# Patient Record
Sex: FEMALE | Race: AMERICAN INDIAN OR ALASKA NATIVE
[De-identification: names, ages, dates, MRNs, and addresses within clinical notes are randomized per-mention and may not be internally consistent; named-entity substitution may affect disease eponyms.]

---

## 2020-01-07 ENCOUNTER — HOSPITAL ENCOUNTER (OUTPATIENT)
Dept: HOSPITAL 5 - TRG | Age: 30
LOS: 3 days | Discharge: HOME | End: 2020-01-10
Attending: OBSTETRICS & GYNECOLOGY
Payer: MEDICAID

## 2020-01-07 DIAGNOSIS — O36.5930: Primary | ICD-10-CM

## 2020-01-07 DIAGNOSIS — Z3A.31: ICD-10-CM

## 2020-01-07 PROCEDURE — 76816 OB US FOLLOW-UP PER FETUS: CPT

## 2020-01-07 PROCEDURE — 76820 UMBILICAL ARTERY ECHO: CPT

## 2020-01-07 PROCEDURE — 86850 RBC ANTIBODY SCREEN: CPT

## 2020-01-07 PROCEDURE — 83735 ASSAY OF MAGNESIUM: CPT

## 2020-01-07 PROCEDURE — 86900 BLOOD TYPING SEROLOGIC ABO: CPT

## 2020-01-07 PROCEDURE — 96360 HYDRATION IV INFUSION INIT: CPT

## 2020-01-07 PROCEDURE — 86901 BLOOD TYPING SEROLOGIC RH(D): CPT

## 2020-01-07 PROCEDURE — 96366 THER/PROPH/DIAG IV INF ADDON: CPT

## 2020-01-07 PROCEDURE — 36415 COLL VENOUS BLD VENIPUNCTURE: CPT

## 2020-01-07 PROCEDURE — 85027 COMPLETE CBC AUTOMATED: CPT

## 2020-01-07 PROCEDURE — 96372 THER/PROPH/DIAG INJ SC/IM: CPT

## 2020-01-07 PROCEDURE — 96361 HYDRATE IV INFUSION ADD-ON: CPT

## 2020-01-07 PROCEDURE — 96365 THER/PROPH/DIAG IV INF INIT: CPT

## 2020-01-07 PROCEDURE — 76819 FETAL BIOPHYS PROFIL W/O NST: CPT

## 2020-01-07 RX ADMIN — BETAMETHASONE SODIUM PHOSPHATE AND BETAMETHASONE ACETATE SCH MG: 3; 3 INJECTION, SUSPENSION INTRA-ARTICULAR; INTRALESIONAL; INTRAMUSCULAR at 23:42

## 2020-01-07 RX ADMIN — SODIUM CHLORIDE, SODIUM LACTATE, POTASSIUM CHLORIDE, AND CALCIUM CHLORIDE SCH MLS/HR: .6; .31; .03; .02 INJECTION, SOLUTION INTRAVENOUS at 23:13

## 2020-01-08 LAB
HCT VFR BLD CALC: 39.8 % (ref 30.3–42.9)
HGB BLD-MCNC: 13.6 GM/DL (ref 10.1–14.3)
MCHC RBC AUTO-ENTMCNC: 34 % (ref 30–34)
MCV RBC AUTO: 95 FL (ref 79–97)
PLATELET # BLD: 315 K/MM3 (ref 140–440)
RBC # BLD AUTO: 4.2 M/MM3 (ref 3.65–5.03)

## 2020-01-08 RX ADMIN — SODIUM CHLORIDE, SODIUM LACTATE, POTASSIUM CHLORIDE, AND CALCIUM CHLORIDE SCH MLS/HR: .6; .31; .03; .02 INJECTION, SOLUTION INTRAVENOUS at 11:59

## 2020-01-08 RX ADMIN — ZOLPIDEM TARTRATE PRN MG: 5 TABLET ORAL at 01:05

## 2020-01-08 NOTE — ULTRASOUND REPORT
Umbilical arterial Ultrasound

Biophysical profile



HISTORY: Severe IUGR hx absent end diastolic flow.



TECHNIQUE:  Grayscale and color Doppler  imaging performed.



COMPARISON:  None



FINDINGS: On biophysical profile, the fetus received a score of 2/2 for breathing, movement, tone, an
d ANGELIA.  Total score was 8/8.  



3 segments of the umbilical cord were evaluated. The average systolic to diastolic ratio within these
 loops was 3.7 which is within normal limits. Normal waveform identified. Persistent flow. The resist
divya index average was 0.7 which is within normal limits with normal waveform and persistent blood jojo
w.



IMPRESSION: 

1. Normal BPP.

2. Normal umbilical arterial evaluation.



Signer Name: Wolfgang Lopez MD 

Signed: 1/8/2020 6:12 PM

 Workstation Name: [id number]

## 2020-01-08 NOTE — HISTORY AND PHYSICAL REPORT
History of Present Illness


Date of examination: 20


Date of admission: 


20


Chief complaint: 


Sent from [facility] for severe IUGR with absent end diastolic flow.


History of present illness: 


Pt is a 30 yo  at 31.0 weeks EGA who presents with severe IUGR (EFW 1%) 

and absent end diastolic flow per [facility] ultrasound yesterday. She has received 

minimal prenatal care with [facility], most recent visit was 

19. She has a history of  x1 for severe IUGR at 29 weeks.





Past History


Past Medical History: no pertinent history


Past Surgical History:  section


Social history: no significant social history





- Obstetrical History


Expected Date of Delivery: 20


Actual Gestation: 31 Week(s) 0 Day(s) 


: 2


Para: 0


Hx # Term Pregnancies: 0


Number of  Pregnancies: 1


Number of Living Children: 1





Medications and Allergies


                                    Allergies











Allergy/AdvReac Type Severity Reaction Status Date / Time


 


No Known Allergies Allergy   Verified 20 21:30











                                Home Medications











 Medication  Instructions  Recorded  Confirmed  Last Taken  Type


 


No Known Home Medications [No  20 Unknown History





Reported Home Medications]     











Active Meds: 


Active Medications





Acetaminophen (Tylenol)  650 mg PO Q4H PRN


   PRN Reason: Pain, Mild (1-3)


Betamethasone Acet/Betameth SodPhos (Celestone Soluspan)  12 mg IM Q24H JOSÉ LUIS


   Stop: 20 21:19


   Last Admin: 20 23:42 Dose:  12 mg


   Documented by: 


Magnesium Sulfate (Magnesium Sulfate 40gm/1000ml)  40 gm in 1,000 mls @ 50 

mls/hr IV AS DIRECT JOSÉ LUIS


   Last Admin: 20 23:45 Dose:  2 gm/hr, 50 mls/hr


   Documented by: 


Lactated Ringer's (Lactated Ringers)  1,000 mls @ 75 mls/hr IV AS DIRECT JOSÉ LUIS


   Last Admin: 20 23:13 Dose:  75 mls/hr


   Documented by: 


Zolpidem Tartrate (Ambien)  5 mg PO QHS PRN


   PRN Reason: Sleep


   Last Admin: 20 01:05 Dose:  5 mg


   Documented by: 











Review of Systems


All systems: negative


Cardiovascular: no chest pain


Respiratory: no shortness of breath


Gastrointestinal: no abdominal pain


Genitourinary: no vaginal bleeding, no vaginal discharge, no leakage of fluid, 

no dysuria, no contractions





- Vital Signs


Vital signs: 


                                   Vital Signs











Pulse BP


 


 82   130/80 


 


 20 20:29  20 20:29








                                        











Temp Pulse Resp BP Pulse Ox


 


 98.4 F   113 H  18   123/77   98 


 


 20 20:45  20 08:12  20 20:45  20 07:42  20 08:12














- Physical Exam


Lungs: Positive: Normal air movement


Abdomen: Positive: soft


Uterus: Positive: enlarged (gravid)





- Obstetrical


FHR: category 1


Uterine Contraction Pattern: Absent





Results


Result Diagrams: 


                                 20 02:00





                              Abnormal lab results











  20 Range/Units





  02:00 


 


WBC  12.3 H  (4.5-11.0)  K/mm3


 


MCH  33 H  (28-32)  pg








All other labs normal.








Assessment and Plan


A:


Severe IUGR


Absent end diastolic flow


Scant prenatal care


Previous c/s x1





P:


NICU consult


MFM consult


MgSO for neuroprotection


Betamethasone x2


Repeat Doppler studies and BPP at 1500


Continuous EFM

## 2020-01-08 NOTE — ULTRASOUND REPORT
Umbilical arterial Ultrasound

Biophysical profile



HISTORY: Severe IUGR hx absent end diastolic flow.



TECHNIQUE:  Grayscale and color Doppler  imaging performed.



COMPARISON:  None



FINDINGS: On biophysical profile, the fetus received a score of 2/2 for breathing, movement, tone, an
d ANGELIA.  Total score was 8/8.  



3 segments of the umbilical cord were evaluated. The average systolic to diastolic ratio within these
 loops was 3.7 which is within normal limits. Normal waveform identified. Persistent flow. The resist
divya index average was 0.7 which is within normal limits with normal waveform and persistent blood jojo
w.



IMPRESSION: 

1. Normal BPP.

2. Normal umbilical arterial evaluation.



Signer Name: Wolfgang Lopez MD 

Signed: 1/8/2020 6:12 PM

 Workstation Name: [id number]

## 2020-01-09 RX ADMIN — BETAMETHASONE SODIUM PHOSPHATE AND BETAMETHASONE ACETATE SCH MG: 3; 3 INJECTION, SUSPENSION INTRA-ARTICULAR; INTRALESIONAL; INTRAMUSCULAR at 00:02

## 2020-01-09 RX ADMIN — ZOLPIDEM TARTRATE PRN MG: 5 TABLET ORAL at 23:10

## 2020-01-09 RX ADMIN — SODIUM CHLORIDE, SODIUM LACTATE, POTASSIUM CHLORIDE, AND CALCIUM CHLORIDE SCH MLS/HR: .6; .31; .03; .02 INJECTION, SOLUTION INTRAVENOUS at 01:43

## 2020-01-09 NOTE — ULTRASOUND REPORT
ULTRASOUND OBSTETRIC LIMITED 

ULTRASOUND FETAL BIOPHYSICAL PROFILE



INDICATION / CLINICAL INFORMATION:

Absent fetal Doppler.



COMPARISON:

OB ultrasound from 1/8/2020.



FINDINGS:



FETAL BREATHING MOVEMENT = 2

GROSS BODY MOVEMENT = 2

FETAL TONE = 2

QUALITATIVE AMNIOTIC FLUID VOLUME = 2



TOTAL BIOPHYSICAL SCORE = 8/8



PRESENTATION: Cephalic. 

FETAL HEART RATE (beats per minute): 128 



ADDITIONAL FINDINGS: 3 segments of the umbilical cord were evaluated. The average systolic to diastol
ic ratio within these loops was 4.42. A normal waveform is identified. There is persistent flow. The 
resistive index average is 0.77, which is normal, with normal waveform and persistent blood flow.



IMPRESSION:

1. Fetal Biophysical Score = 8/8 

2. Slightly elevated average systolic to diastolic ratio of the umbilical artery of 4.42. Otherwise u
nremarkable umbilical arterial evaluation.



Signer Name: Sandor Camargo MD 

Signed: 1/9/2020 10:44 AM

 Workstation Name: [id number]

## 2020-01-09 NOTE — ULTRASOUND REPORT
ULTRASOUND OBSTETRIC LIMITED 

ULTRASOUND FETAL BIOPHYSICAL PROFILE



INDICATION / CLINICAL INFORMATION:

Absent fetal Doppler.



COMPARISON:

OB ultrasound from 1/8/2020.



FINDINGS:



FETAL BREATHING MOVEMENT = 2

GROSS BODY MOVEMENT = 2

FETAL TONE = 2

QUALITATIVE AMNIOTIC FLUID VOLUME = 2



TOTAL BIOPHYSICAL SCORE = 8/8



PRESENTATION: Cephalic. 

FETAL HEART RATE (beats per minute): 128 



ADDITIONAL FINDINGS: 3 segments of the umbilical cord were evaluated. The average systolic to diastol
ic ratio within these loops was 4.42. A normal waveform is identified. There is persistent flow. The 
resistive index average is 0.77, which is normal, with normal waveform and persistent blood flow.



IMPRESSION:

1. Fetal Biophysical Score = 8/8 

2. Slightly elevated average systolic to diastolic ratio of the umbilical artery of 4.42. Otherwise u
nremarkable umbilical arterial evaluation.



Signer Name: Sandor Camargo MD 

Signed: 1/9/2020 10:44 AM

 Workstation Name: [id number]

## 2020-01-10 VITALS — DIASTOLIC BLOOD PRESSURE: 81 MMHG | SYSTOLIC BLOOD PRESSURE: 130 MMHG

## 2020-01-10 RX ADMIN — SODIUM CHLORIDE, SODIUM LACTATE, POTASSIUM CHLORIDE, AND CALCIUM CHLORIDE SCH MLS/HR: .6; .31; .03; .02 INJECTION, SOLUTION INTRAVENOUS at 03:53

## 2020-01-10 NOTE — ULTRASOUND REPORT
ULTRASOUND FETAL BIOPHYSICAL PROFILE



INDICATION:  IUGR, absent end diastolic flow, fetal well being.



COMPARISON: None available.



FINDINGS:



Fetal heart rate is 131 beats per minute.



Fetal breathing movement = 2

Gross body movement = 2

Fetal tone = 2

Qualitative amniotic fluid volume = 2





IMPRESSION:

Fetal biophysical profile = 8/8



Signer Name: Dewayne Mayfield Jr, MD 

Signed: 1/10/2020 10:09 AM

 Workstation Name: [id number]

## 2020-01-10 NOTE — PROGRESS NOTE
Assessment and Plan





 Assess:


SIUP @ 31.0 weeks


IUGR @ 1st%tile by [facility]'s U/S 1/7


AEDF


Hx PTD @ 29 weeks for IUGR, abnoraml doppler


Previous c/section








Plan:


As previously recommended


BMZ, magnesium for NP


Repeat BPP/doppler MWP


Continuous fetal monitoring


NICU consult


Deliver for Cat 3 tracing


Contact oncall physician for concerns





Subjective





- Subjective


Date of service: 01/08/20


Principal diagnosis: IUGR w/abnoraml dopplers


Patient reports: fetal movement normal





Objective





- Vital Signs


Vital Signs: 


                               Vital Signs - 12hr











  01/08/20 01/08/20 01/08/20





  04:24 04:29 04:34


 


Temperature   


 


Pulse Rate 94 H 92 H 82


 


Respiratory   





Rate   


 


Blood Pressure   


 


Blood Pressure   





[Left]   


 


O2 Sat by Pulse 100 98 99





Oximetry   














  01/08/20 01/08/20 01/08/20





  04:39 04:44 04:49


 


Temperature   


 


Pulse Rate 84 90 90


 


Respiratory   





Rate   


 


Blood Pressure   


 


Blood Pressure   





[Left]   


 


O2 Sat by Pulse 98 98 98





Oximetry   














  01/08/20 01/08/20 01/08/20





  04:54 04:59 05:04


 


Temperature   


 


Pulse Rate 71 83 78


 


Respiratory   





Rate   


 


Blood Pressure   


 


Blood Pressure   





[Left]   


 


O2 Sat by Pulse 98 98 98





Oximetry   














  01/08/20 01/08/20 01/08/20





  05:09 05:14 05:19


 


Temperature   


 


Pulse Rate 74 73 82


 


Respiratory   





Rate   


 


Blood Pressure   


 


Blood Pressure   





[Left]   


 


O2 Sat by Pulse 98 98 98





Oximetry   














  01/08/20 01/08/20 01/08/20





  05:24 05:25 05:29


 


Temperature   


 


Pulse Rate 88 88 80


 


Respiratory   





Rate   


 


Blood Pressure  121/72 


 


Blood Pressure   





[Left]   


 


O2 Sat by Pulse 99  99





Oximetry   














  01/08/20 01/08/20 01/08/20





  05:34 05:39 05:43


 


Temperature   


 


Pulse Rate 91 H 86 80


 


Respiratory   





Rate   


 


Blood Pressure   128/81


 


Blood Pressure   





[Left]   


 


O2 Sat by Pulse 99 99 





Oximetry   














  01/08/20 01/08/20 01/08/20





  05:44 05:49 05:54


 


Temperature   


 


Pulse Rate 86 98 H 89


 


Respiratory   





Rate   


 


Blood Pressure   


 


Blood Pressure   





[Left]   


 


O2 Sat by Pulse 99 99 99





Oximetry   














  01/08/20 01/08/20 01/08/20





  05:59 06:04 06:09


 


Temperature   


 


Pulse Rate 110 H 90 85


 


Respiratory   





Rate   


 


Blood Pressure   


 


Blood Pressure   





[Left]   


 


O2 Sat by Pulse 100 99 99





Oximetry   














  01/08/20 01/08/20 01/08/20





  06:14 06:19 06:24


 


Temperature   


 


Pulse Rate 77 80 73


 


Respiratory   





Rate   


 


Blood Pressure   


 


Blood Pressure   





[Left]   


 


O2 Sat by Pulse 99 98 99





Oximetry   














  01/08/20 01/08/20 01/08/20





  06:29 06:34 06:39


 


Temperature   


 


Pulse Rate 84 80 81


 


Respiratory   





Rate   


 


Blood Pressure   


 


Blood Pressure   





[Left]   


 


O2 Sat by Pulse 98 98 98





Oximetry   














  01/08/20 01/08/20 01/08/20





  06:42 06:44 06:49


 


Temperature   


 


Pulse Rate 81 78 87


 


Respiratory   





Rate   


 


Blood Pressure 111/57  


 


Blood Pressure   





[Left]   


 


O2 Sat by Pulse  98 97





Oximetry   














  01/08/20 01/08/20 01/08/20





  06:54 06:59 07:04


 


Temperature   


 


Pulse Rate 84 83 88


 


Respiratory   





Rate   


 


Blood Pressure   


 


Blood Pressure   





[Left]   


 


O2 Sat by Pulse 97 97 97





Oximetry   














  01/08/20 01/08/20 01/08/20





  07:20 07:22 07:27


 


Temperature 97.2 F L  


 


Pulse Rate 92 H 93 H 104 H


 


Respiratory 16  





Rate   


 


Blood Pressure  134/83 


 


Blood Pressure 134/83  





[Left]   


 


O2 Sat by Pulse  96 99





Oximetry   














  01/08/20 01/08/20 01/08/20





  07:32 07:37 07:42


 


Temperature   


 


Pulse Rate 98 H 103 H 97 H


 


Respiratory   





Rate   


 


Blood Pressure   123/77


 


Blood Pressure   





[Left]   


 


O2 Sat by Pulse 99 99 98





Oximetry   














  01/08/20 01/08/20 01/08/20





  07:47 07:52 07:57


 


Temperature   


 


Pulse Rate 97 H 98 H 107 H


 


Respiratory   





Rate   


 


Blood Pressure   


 


Blood Pressure   





[Left]   


 


O2 Sat by Pulse 99 99 99





Oximetry   














  01/08/20 01/08/20 01/08/20





  08:02 08:07 08:12


 


Temperature   


 


Pulse Rate 104 H 100 H 113 H


 


Respiratory   





Rate   


 


Blood Pressure   


 


Blood Pressure   





[Left]   


 


O2 Sat by Pulse 99 99 98





Oximetry   














  01/08/20 01/08/20 01/08/20





  08:17 08:22 08:27


 


Temperature   


 


Pulse Rate 103 H 104 H 102 H


 


Respiratory   





Rate   


 


Blood Pressure   


 


Blood Pressure   





[Left]   


 


O2 Sat by Pulse 99 99 98





Oximetry   














  01/08/20 01/08/20 01/08/20





  08:33 08:38 08:45


 


Temperature   


 


Pulse Rate 100 H 99 H 105 H


 


Respiratory   





Rate   


 


Blood Pressure   140/80


 


Blood Pressure   





[Left]   


 


O2 Sat by Pulse 98 99 





Oximetry   














  01/08/20 01/08/20 01/08/20





  14:51 14:56 15:01


 


Temperature   


 


Pulse Rate 82 89 83


 


Respiratory   





Rate   


 


Blood Pressure 140/82  


 


Blood Pressure   





[Left]   


 


O2 Sat by Pulse 100 100 98





Oximetry   














  01/08/20 01/08/20 01/08/20





  15:06 15:11 15:13


 


Temperature   


 


Pulse Rate 95 H 61 96 H


 


Respiratory   





Rate   


 


Blood Pressure   


 


Blood Pressure   





[Left]   


 


O2 Sat by Pulse 98 82 L 100





Oximetry   














  01/08/20 01/08/20 01/08/20





  15:18 15:23 15:28


 


Temperature   


 


Pulse Rate 87 104 H 101 H


 


Respiratory   





Rate   


 


Blood Pressure   


 


Blood Pressure   





[Left]   


 


O2 Sat by Pulse 99 99 99





Oximetry   














  01/08/20 01/08/20 01/08/20





  15:33 15:38 15:43


 


Temperature   


 


Pulse Rate 94 H 96 H 94 H


 


Respiratory   





Rate   


 


Blood Pressure   


 


Blood Pressure   





[Left]   


 


O2 Sat by Pulse 100 100 100





Oximetry   














  01/08/20 01/08/20 01/08/20





  15:48 15:53 15:58


 


Temperature   


 


Pulse Rate 94 H 89 119 H


 


Respiratory   





Rate   


 


Blood Pressure   


 


Blood Pressure   





[Left]   


 


O2 Sat by Pulse 99 98 97





Oximetry   














  01/08/20 01/08/20 01/08/20





  16:03 16:08 16:13


 


Temperature   


 


Pulse Rate 109 H 97 H 98 H


 


Respiratory   





Rate   


 


Blood Pressure   


 


Blood Pressure   





[Left]   


 


O2 Sat by Pulse 99 100 99





Oximetry   














  01/08/20





  16:18


 


Temperature 


 


Pulse Rate 95 H


 


Respiratory 





Rate 


 


Blood Pressure 


 


Blood Pressure 





[Left] 


 


O2 Sat by Pulse 99





Oximetry 














- Exam


Breasts: deferred


Cardiovascular: Regular rate, Normal S1


Lungs: Normal air movement


Abdomen: Present: normal appearance





- Labs


Labs: 


                                  Abnormal Labs











  01/08/20 01/08/20 01/08/20





  02:00 09:10 12:50


 


WBC  12.3 H  


 


MCH  33 H  


 


Magnesium   5.50 H  5.80 H








                         Laboratory Results - last 24 hr











  01/08/20 01/08/20 01/08/20





  02:00 09:10 09:10


 


WBC  12.3 H  


 


RBC  4.20  


 


Hgb  13.6  


 


Hct  39.8  


 


MCV  95  


 


MCH  33 H  


 


MCHC  34  


 


RDW  13.7  


 


Plt Count  315  


 


Magnesium    5.50 H


 


Blood Type   B POSITIVE 


 


Antibody Screen   Negative 














  01/08/20





  12:50


 


WBC 


 


RBC 


 


Hgb 


 


Hct 


 


MCV 


 


MCH 


 


MCHC 


 


RDW 


 


Plt Count 


 


Magnesium  5.80 H


 


Blood Type 


 


Antibody Screen
Assessment and Plan





 Assessments:


SIUP @ 31.1 weeks


Severe  IUGR by most recent [facility] with  EFW @ 1% 


1/7/2020 [facility]  abnormal doppler : AEDF


1/7/2020 [facility] normal MCA doppler 


1/8/2020 [facility] Normal umbilical dopper and BPP of 8/8


1/9/2020 [facility] Elevated umbilical doppler 


1/9/2020 [facility] Reassuring BPP of 8/8 


Previous c/section @ 29 week  Severe IUGR with abnormal dopplers


Previously Declined Amnio 


- States OB obtained NIPT and "neg boy" - Please confirm


S/P  BMZ for FLM 


S/P MgSO4 for neuroprotection


Noncompliant patient 


- had NOT seen Ob since 9/2019


- had NOT obtained previously requested labs





Plan:


Remain in patient . As previously recommended


previously ordered labs were not completed **** please see provided lab 

requistion ****


Tomorrow AM Repeat BPP/umbilical and MCA doppler and  Perform fetal growth 

assessment 


Continuous fetal monitoring


NICU consult


Delivery for Cat 3 tracing/ fetal distress /fetal  compromise


OB provider is to contact on call [facility] physician Dr. Calderon  for concerns





 








Subjective





- Subjective


Principal diagnosis: IUGR w/abnoraml dopplers


Patient reports: fetal movement normal, no new complaints, no loss of fluid, no 

vaginal bleeding, no contractions





Objective





- Vital Signs


Vital Signs: 


                               Vital Signs - 12hr











  01/08/20 01/08/20 01/08/20





  23:18 23:23 23:28


 


Temperature   


 


Pulse Rate 103 H 102 H 88


 


Respiratory   





Rate   


 


Blood Pressure   


 


O2 Sat by Pulse 98 99 98





Oximetry   














  01/08/20 01/08/20 01/08/20





  23:33 23:38 23:43


 


Temperature   


 


Pulse Rate 89 89 95 H


 


Respiratory   





Rate   


 


Blood Pressure   


 


O2 Sat by Pulse 99 98 95





Oximetry   














  01/08/20 01/08/20 01/08/20





  23:45 23:48 23:53


 


Temperature   


 


Pulse Rate 92 H 97 H 91 H


 


Respiratory   





Rate   


 


Blood Pressure 140/86  


 


O2 Sat by Pulse  98 99





Oximetry   














  01/08/20 01/09/20 01/09/20





  23:58 00:00 00:03


 


Temperature  98.1 F 


 


Pulse Rate 90  82


 


Respiratory   





Rate   


 


Blood Pressure   


 


O2 Sat by Pulse 98  99





Oximetry   














  01/09/20 01/09/20 01/09/20





  00:08 00:12 00:18


 


Temperature   


 


Pulse Rate 80 80 86


 


Respiratory   





Rate   


 


Blood Pressure   


 


O2 Sat by Pulse 99 98 99





Oximetry   














  01/09/20 01/09/20 01/09/20





  00:23 00:28 00:33


 


Temperature   


 


Pulse Rate 86 89 97 H


 


Respiratory   





Rate   


 


Blood Pressure   


 


O2 Sat by Pulse 99 100 100





Oximetry   














  01/09/20 01/09/20 01/09/20





  00:38 00:43 00:44


 


Temperature   


 


Pulse Rate 94 H 92 H 90


 


Respiratory   





Rate   


 


Blood Pressure  124/74 


 


O2 Sat by Pulse 97 98 92





Oximetry   














  01/09/20 01/09/20 01/09/20





  00:48 00:53 00:58


 


Temperature   


 


Pulse Rate 92 H 89 88


 


Respiratory   





Rate   


 


Blood Pressure   


 


O2 Sat by Pulse 99 99 100





Oximetry   














  01/09/20 01/09/20 01/09/20





  01:03 01:08 01:13


 


Temperature   


 


Pulse Rate 84 86 83


 


Respiratory   





Rate   


 


Blood Pressure   


 


O2 Sat by Pulse 98 98 99





Oximetry   














  01/09/20 01/09/20 01/09/20





  01:18 01:23 01:28


 


Temperature   


 


Pulse Rate 86 86 86


 


Respiratory   





Rate   


 


Blood Pressure   


 


O2 Sat by Pulse 98 98 99





Oximetry   














  01/09/20 01/09/20 01/09/20





  01:33 01:38 01:43


 


Temperature   


 


Pulse Rate 97 H 91 H 100 H


 


Respiratory   





Rate   


 


Blood Pressure   133/92


 


O2 Sat by Pulse 98 96 92





Oximetry   














  01/09/20 01/09/20 01/09/20





  01:48 02:42 03:42


 


Temperature   


 


Pulse Rate 89 85 84


 


Respiratory   





Rate   


 


Blood Pressure  115/62 120/68


 


O2 Sat by Pulse 99  





Oximetry   














  01/09/20 01/09/20 01/09/20





  06:10 06:12 06:14


 


Temperature  98.0 F 


 


Pulse Rate 87  98 H


 


Respiratory  180 H 





Rate   


 


Blood Pressure 126/65  


 


O2 Sat by Pulse  99 99





Oximetry   














  01/09/20 01/09/20 01/09/20





  06:19 06:24 06:29


 


Temperature   


 


Pulse Rate 105 H 89 89


 


Respiratory   





Rate   


 


Blood Pressure   


 


O2 Sat by Pulse 99 99 100





Oximetry   














  01/09/20 01/09/20 01/09/20





  06:34 06:39 06:42


 


Temperature   


 


Pulse Rate 96 H 91 H 85


 


Respiratory   





Rate   


 


Blood Pressure   133/78


 


O2 Sat by Pulse 100 100 





Oximetry   














  01/09/20 01/09/20 01/09/20





  06:44 06:49 06:54


 


Temperature   


 


Pulse Rate 85 104 H 87


 


Respiratory   





Rate   


 


Blood Pressure   


 


O2 Sat by Pulse 100 100 99





Oximetry   














  01/09/20 01/09/20 01/09/20





  06:59 07:04 07:09


 


Temperature   


 


Pulse Rate 90 97 H 107 H


 


Respiratory   





Rate   


 


Blood Pressure   


 


O2 Sat by Pulse 99 99 99





Oximetry   














  01/09/20 01/09/20 01/09/20





  07:14 07:19 07:24


 


Temperature   


 


Pulse Rate 73 87 75


 


Respiratory   





Rate   


 


Blood Pressure   


 


O2 Sat by Pulse 98 97 100





Oximetry   














- Exam


Cardiovascular: Regular rate


Lungs: Normal air movement


Abdomen: Absent: tenderness, guarding


Uterus: Present: other (gravid).  Absent: tenderness


FHR: other (During consult patient was not on  EFM . RN reports Cat 1 prior 

tracing)


Uterine Contraction Monitor Mode: Palpation (no contractions palpable)


Uterine Contraction Pattern: Absent


Extremities: other (no edema)


Deep Tendon Reflex Grade: Normal +2





- Labs


Labs: 


                                  Abnormal Labs











  01/08/20 01/08/20 01/08/20





  02:00 09:10 12:50


 


WBC  12.3 H  


 


MCH  33 H  


 


Magnesium   5.50 H  5.80 H














  01/08/20 01/09/20 01/09/20





  18:53 00:28 06:05


 


WBC   


 


MCH   


 


Magnesium  4.80 H  4.40 H  3.60 H








                         Laboratory Results - last 24 hr











  01/08/20 01/08/20 01/09/20





  12:50 18:53 00:28


 


Magnesium  5.80 H  4.80 H  4.40 H














  01/09/20





  06:05


 


Magnesium  3.60 H














- Results


US- obstetric: pending (reviewed preliminary report.  Pleasse see chartfor full 

report)
Assessment and Plan





A- 


IUP 31.2 weeks (BILL 3/11/20 ) 


Severe IUGR  with abnormal doppler studies


Improvements in umbilical doppler studies today( 1/10/20)- 3.78, 3.86, 3.57


BPP 8/8 ( 1/10/20)


EFW 1029g (1/10/20)





Previous studies:


1/7/2020 [facility]  abnormal doppler : AEDF


1/7/2020 [facility] normal MCA doppler 


1/8/2020 [facility] Normal umbilical dopper and BPP of 8/8 1/9/2020 [facility] Elevated umbilical doppler 


1/9/2020 [facility] Reassuring BPP of 8/8 





Pt history of c/section @ 29 weeks for Severe IUGR with abnormal dopplers


Previously Declined Amnio - States OB obtained NIPT and "neg boy" - 


S/P  Betamethasome x 2 and Magnesium Sulfate


Noncompliant patient - had NOT seen Ob since 9/2019, had NOT obtained previously

requested labs








I discussed today's findings with Dr. Calderon today.  Recommendations made for 

outpatient management with [facility] three times weekly secondary to severe IUGR.








P:


-Pt ok to be discharged with follow up 3 times weekly outpatient with [facility] ( 

Monday, Wednesday, and Friday.) With abnormal dopplers/AEDF, pt to be admitted 

again for evaluation and possible delivery.


-Please confirm IUGR labs- TORCH panel ( IGG/IGM), Parvovirus (IGG/IGM), 

Antiphospholipid panel, HgbA1c, and TSH


-Continuous fetal monitoring until pt discharged


-Please confirm normal NIPT results








For additional questions or concerns, please contact [facility] on call MD ( Dr. Calderon). Thank you.








Subjective





- Subjective


Date of service: 01/10/20


Principal diagnosis: IUGR w/abnoraml dopplers


Patient reports: fetal movement normal, no new complaints, no loss of fluid, no 

vaginal bleeding, no contractions





Objective





- Vital Signs


Vital Signs: 


                               Vital Signs - 12hr











  01/10/20 01/10/20 01/10/20





  01:45 01:54 04:08


 


Temperature  97.8 F 


 


Pulse Rate 104 H  75


 


Respiratory  18 





Rate   


 


Blood Pressure 128/79  121/76


 


Blood Pressure   





[Left]   


 


O2 Sat by Pulse   





Oximetry   














  01/10/20 01/10/20 01/10/20





  07:57 07:58 13:05


 


Temperature 98.2 F  98.6 F


 


Pulse Rate 70 73 


 


Respiratory 16  20





Rate   


 


Blood Pressure  119/82 


 


Blood Pressure 119/82  





[Left]   


 


O2 Sat by Pulse 100  





Oximetry   














- Exam


Breasts: deferred


Cardiovascular: Regular rate, Normal S1, Normal S2


Lungs: Clear to auscultation, Normal air movement


Abdomen: Present: normal appearance, soft (gravid)





- Labs


Labs: 


                                  Abnormal Labs











  01/08/20 01/08/20 01/08/20





  02:00 09:10 12:50


 


WBC  12.3 H  


 


MCH  33 H  


 


Magnesium   5.50 H  5.80 H














  01/08/20 01/09/20 01/09/20





  18:53 00:28 06:05


 


WBC   


 


MCH   


 


Magnesium  4.80 H  4.40 H  3.60 H
Assessment and Plan





A/P


IUP 30 weith with IUGR no end diastolic flow 


s/p bmz and mag


seen by [facility] and MFM


await recommnedations from high risk on disposition





Subjective





- Subjective


Date of service: 01/09/20


Principal diagnosis: IUGR w/abnoraml dopplers


Patient reports: fetal movement normal, no loss of fluid, no vaginal bleeding, 

no contractions





Objective





- Vital Signs


Vital Signs: 


                               Vital Signs - 12hr











  01/08/20 01/08/20 01/08/20





  21:09 21:14 21:19


 


Temperature   


 


Pulse Rate 89 92 H 93 H


 


Respiratory   





Rate   


 


Blood Pressure   


 


O2 Sat by Pulse 100 99 100





Oximetry   














  01/08/20 01/08/20 01/08/20





  21:24 21:29 21:34


 


Temperature   


 


Pulse Rate 86 88 89


 


Respiratory   





Rate   


 


Blood Pressure   


 


O2 Sat by Pulse 99 100 99





Oximetry   














  01/08/20 01/08/20 01/08/20





  21:39 21:42 21:44


 


Temperature   


 


Pulse Rate 92 H 99 H 94 H


 


Respiratory   





Rate   


 


Blood Pressure  133/74 


 


O2 Sat by Pulse 100  100





Oximetry   














  01/08/20 01/08/20 01/08/20





  21:49 21:54 21:59


 


Temperature   


 


Pulse Rate 114 H 118 H 120 H


 


Respiratory   





Rate   


 


Blood Pressure   


 


O2 Sat by Pulse 100 100 99





Oximetry   














  01/08/20 01/08/20 01/08/20





  22:20 22:25 22:29


 


Temperature   


 


Pulse Rate 127 H 113 H 156 H


 


Respiratory   





Rate   


 


Blood Pressure   


 


O2 Sat by Pulse 98 99 99





Oximetry   














  01/08/20 01/08/20 01/08/20





  22:35 22:40 22:43


 


Temperature   


 


Pulse Rate 129 H 133 H 127 H


 


Respiratory   





Rate   


 


Blood Pressure   155/81


 


O2 Sat by Pulse 98 98 





Oximetry   














  01/08/20 01/08/20 01/08/20





  22:45 22:53 22:58


 


Temperature   


 


Pulse Rate 133 H 122 H 131 H


 


Respiratory   





Rate   


 


Blood Pressure   


 


O2 Sat by Pulse 98 98 98





Oximetry   














  01/08/20 01/08/20 01/08/20





  23:03 23:08 23:13


 


Temperature   


 


Pulse Rate 106 H 116 H 107 H


 


Respiratory   





Rate   


 


Blood Pressure   


 


O2 Sat by Pulse 97 98 98





Oximetry   














  01/08/20 01/08/20 01/08/20





  23:18 23:23 23:28


 


Temperature   


 


Pulse Rate 103 H 102 H 88


 


Respiratory   





Rate   


 


Blood Pressure   


 


O2 Sat by Pulse 98 99 98





Oximetry   














  01/08/20 01/08/20 01/08/20





  23:33 23:38 23:43


 


Temperature   


 


Pulse Rate 89 89 95 H


 


Respiratory   





Rate   


 


Blood Pressure   


 


O2 Sat by Pulse 99 98 95





Oximetry   














  01/08/20 01/08/20 01/08/20





  23:45 23:48 23:53


 


Temperature   


 


Pulse Rate 92 H 97 H 91 H


 


Respiratory   





Rate   


 


Blood Pressure 140/86  


 


O2 Sat by Pulse  98 99





Oximetry   














  01/08/20 01/09/20 01/09/20





  23:58 00:00 00:03


 


Temperature  98.1 F 


 


Pulse Rate 90  82


 


Respiratory   





Rate   


 


Blood Pressure   


 


O2 Sat by Pulse 98  99





Oximetry   














  01/09/20 01/09/20 01/09/20





  00:08 00:12 00:18


 


Temperature   


 


Pulse Rate 80 80 86


 


Respiratory   





Rate   


 


Blood Pressure   


 


O2 Sat by Pulse 99 98 99





Oximetry   














  01/09/20 01/09/20 01/09/20





  00:23 00:28 00:33


 


Temperature   


 


Pulse Rate 86 89 97 H


 


Respiratory   





Rate   


 


Blood Pressure   


 


O2 Sat by Pulse 99 100 100





Oximetry   














  01/09/20 01/09/20 01/09/20





  00:38 00:43 00:44


 


Temperature   


 


Pulse Rate 94 H 92 H 90


 


Respiratory   





Rate   


 


Blood Pressure  124/74 


 


O2 Sat by Pulse 97 98 92





Oximetry   














  01/09/20 01/09/20 01/09/20





  00:48 00:53 00:58


 


Temperature   


 


Pulse Rate 92 H 89 88


 


Respiratory   





Rate   


 


Blood Pressure   


 


O2 Sat by Pulse 99 99 100





Oximetry   














  01/09/20 01/09/20 01/09/20





  01:03 01:08 01:13


 


Temperature   


 


Pulse Rate 84 86 83


 


Respiratory   





Rate   


 


Blood Pressure   


 


O2 Sat by Pulse 98 98 99





Oximetry   














  01/09/20 01/09/20 01/09/20





  01:18 01:23 01:28


 


Temperature   


 


Pulse Rate 86 86 86


 


Respiratory   





Rate   


 


Blood Pressure   


 


O2 Sat by Pulse 98 98 99





Oximetry   














  01/09/20 01/09/20 01/09/20





  01:33 01:38 01:43


 


Temperature   


 


Pulse Rate 97 H 91 H 100 H


 


Respiratory   





Rate   


 


Blood Pressure   133/92


 


O2 Sat by Pulse 98 96 92





Oximetry   














  01/09/20 01/09/20 01/09/20





  01:48 02:42 03:42


 


Temperature   


 


Pulse Rate 89 85 84


 


Respiratory   





Rate   


 


Blood Pressure  115/62 120/68


 


O2 Sat by Pulse 99  





Oximetry   














  01/09/20 01/09/20 01/09/20





  06:10 06:12 06:14


 


Temperature  98.0 F 


 


Pulse Rate 87  98 H


 


Respiratory  180 H 





Rate   


 


Blood Pressure 126/65  


 


O2 Sat by Pulse  99 99





Oximetry   














  01/09/20 01/09/20 01/09/20





  06:19 06:24 06:29


 


Temperature   


 


Pulse Rate 105 H 89 89


 


Respiratory   





Rate   


 


Blood Pressure   


 


O2 Sat by Pulse 99 99 100





Oximetry   














  01/09/20 01/09/20 01/09/20





  06:34 06:39 06:42


 


Temperature   


 


Pulse Rate 96 H 91 H 85


 


Respiratory   





Rate   


 


Blood Pressure   133/78


 


O2 Sat by Pulse 100 100 





Oximetry   














  01/09/20 01/09/20 01/09/20





  06:44 06:49 06:54


 


Temperature   


 


Pulse Rate 85 104 H 87


 


Respiratory   





Rate   


 


Blood Pressure   


 


O2 Sat by Pulse 100 100 99





Oximetry   














  01/09/20 01/09/20 01/09/20





  06:59 07:04 07:09


 


Temperature   


 


Pulse Rate 90 97 H 107 H


 


Respiratory   





Rate   


 


Blood Pressure   


 


O2 Sat by Pulse 99 99 99





Oximetry   














  01/09/20 01/09/20 01/09/20





  07:14 07:19 07:24


 


Temperature   


 


Pulse Rate 73 87 75


 


Respiratory   





Rate   


 


Blood Pressure   


 


O2 Sat by Pulse 98 97 100





Oximetry   














- Exam


Breasts: deferred


Cardiovascular: Regular rate, Normal S1


Lungs: Clear to auscultation, Normal air movement


Abdomen: Present: normal appearance, soft, normal bowel sounds.  Absent: 

distention, tenderness, guarding


Vulva: both: normal


Uterus: Present: normal, firm.  Absent: bogginess, tenderness


FHR: category 1





- Labs


Labs: 


                                  Abnormal Labs











  01/08/20 01/08/20 01/08/20





  02:00 09:10 12:50


 


WBC  12.3 H  


 


MCH  33 H  


 


Magnesium   5.50 H  5.80 H














  01/08/20 01/09/20 01/09/20





  18:53 00:28 06:05


 


WBC   


 


MCH   


 


Magnesium  4.80 H  4.40 H  3.60 H








                         Laboratory Results - last 24 hr











  01/08/20 01/08/20 01/08/20





  09:10 09:10 12:50


 


Magnesium   5.50 H  5.80 H


 


Blood Type  B POSITIVE  


 


Antibody Screen  Negative  














  01/08/20 01/09/20 01/09/20





  18:53 00:28 06:05


 


Magnesium  4.80 H  4.40 H  3.60 H


 


Blood Type   


 


Antibody Screen
Assessment and Plan





IUP at 31w2d


IUGR


Admitted for absent end diastolic flow, now with normal dopplers


MFM recommendation for discharge home with TID follow up in office. 


Fetal kick counts 





Subjective





- Subjective


Date of service: 01/10/20


Principal diagnosis: IUGR w/abnoraml dopplers


Interval history: 


Pt without complaints. s/p BPP today 8/8 and normal dopplers. MFM recommendation

today for discharge home with TID follow up in office. 


Patient reports: fetal movement normal, no new complaints, no loss of fluid, no 

vaginal bleeding, no contractions





Objective





- Vital Signs


Vital Signs: 


                               Vital Signs - 12hr











  01/10/20 01/10/20 01/10/20





  04:08 07:57 07:58


 


Temperature  98.2 F 


 


Pulse Rate 75 70 73


 


Respiratory  16 





Rate   


 


Blood Pressure 121/76  119/82


 


Blood Pressure  119/82 





[Left]   


 


O2 Sat by Pulse  100 





Oximetry   














  01/10/20





  13:05


 


Temperature 98.6 F


 


Pulse Rate 


 


Respiratory 20





Rate 


 


Blood Pressure 


 


Blood Pressure 





[Left] 


 


O2 Sat by Pulse 





Oximetry 














- Exam


Breasts: deferred


Cardiovascular: Regular rate


Lungs: Clear to auscultation


Abdomen: Present: soft (gravid )


Uterus: Present: normal (gravid )


FHR: auscultation normal


Uterine Contraction Pattern: Absent


Uterine Tone Measurement Phase: Resting


Extremities: normal





- Labs


Labs: 


                                  Abnormal Labs











  01/08/20 01/08/20 01/08/20





  02:00 09:10 12:50


 


WBC  12.3 H  


 


MCH  33 H  


 


Magnesium   5.50 H  5.80 H














  01/08/20 01/09/20 01/09/20





  18:53 00:28 06:05


 


WBC   


 


MCH   


 


Magnesium  4.80 H  4.40 H  3.60 H
10.7   5.7   )-----------( 321      ( 15 Brandon 2018 21:34 )             32.2       01-15    134<L>  |  95<L>  |  22  ----------------------------<  188<H>  3.7   |  26  |  1.14    Ca    8.9      15 Brandon 2018 21:34    TPro  7.0  /  Alb  3.7  /  TBili  0.2  /  DBili  x   /  AST  23  /  ALT  17  /  AlkPhos  67  01-15      PT/INR - ( 15 Brandon 2018 21:34 )   PT: 11.7 sec;   INR: 1.05          PTT - ( 15 Brandon 2018 21:34 )  PTT:28.0 sec    CARDIAC MARKERS ( 15 Brandon 2018 21:34 )  x     / <0.01 ng/mL / 50 U/L / x     / 1.5 ng/mL        Urinalysis Basic - ( 15 Brandon 2018 23:14 )    Color: Yellow / Appearance: Clear / S.010 / pH: x  Gluc: x / Ketone: Trace mg/dL  / Bili: Negative / Urobili: 0.2 E.U./dL   Blood: x / Protein: NEGATIVE mg/dL / Nitrite: NEGATIVE   Leuk Esterase: Small / RBC: < 5 /HPF / WBC > 10 /HPF   Sq Epi: x / Non Sq Epi: Moderate /HPF / Bacteria: Present /HPF        EKG: Sinus Heri @59bpm with non specific ST-T wave changes

## 2020-01-10 NOTE — DISCHARGE SUMMARY
Providers





- Providers


Date of Admission: 


20


Date of discharge: 01/10/20


Attending physician: 


STELLA MOONEY





                                        





20 21:34


Consult to Physician [CONS] Routine 


   Comment: 


   Consulting Provider: RENUKA DELGADO


   Physician Instructions: 


   Reason For Exam: severe IUGR and abnormal Doppler studies





20 22:46


Consult to Physician [CONS] Routine 


   Comment: NICU


   Consulting Provider: BOBBY FUNES


   Physician Instructions: 


   Reason For Exam: Anticipated  delivery











Primary care physician: 


STELLA MOONEY








Hospitalization


Reason for admission: other (Absent end diastolic flow on umbilical dopplers )


Other postpartum procedures: none


Postpartum complications: none


Hospital course: 





Pt admitted for absent end diastolic flow on umbilical artery dopplers and IUGR.

 She had multiple biophysical profiles and doppler studies while hospitalized. 

By HD#2, she had normal doppler studies and a biophysical profile of 8/8. She 

was cleared by [facility] for discharge and will follow up TID in the office. She has 

been given strict fetal movement precautions. 


Condition at discharge: Stable


Disposition: [place]-01 TO HOME OR SELFCARE





- Discharge Diagnoses


(1) IUGR (intrauterine growth restriction)


Status: Acute   





(2) Previous  section


Status: Acute   





Plan





- Provider Discharge Summary


Activity: routine


Diet: routine


Instructions: routine


Additional instructions: 


[]  Smoking cessation referral if applicable(refer to patient education folder 

for contact #)


[]  Refer to [facility] Booklet








Call your doctor immediately for:


* Fever > 100.5


* Heavy vaginal bleeding ( >1 pad per hour)


* Severe persistent headache


* Shortness of breath


* Reddened, hot, painful area to leg or breast


* Drainage or odor from incision.





* Keep incision clean and dry at all times and follow doctor's instructions 

regarding bathing/showering











- Follow up plan


Follow up: 


STELLA MOONEY MD [Primary Care Provider] - 20 (Please schedule an 

appt with your provider )


AUREA TAYLOR MD [Staff Physician] - 20 (Please schedule an appt with 

your high  )

## 2020-01-10 NOTE — ULTRASOUND REPORT
ULTRASOUND OB VELOCIMETRY UMBILICAL ARTERY



HISTORY: Intrauterine growth restriction, absent end-diastolic flow



TECHNIQUE: Transabdominal imaging with color and spectral Doppler interrogation.



COMPARISON: 1/9/2020



FINDINGS:



3 segments of the umbilical cord were evaluated. Spectral Doppler waveforms are normal and persistent
. No evidence for loss of end-diastolic flow. Heart rate measures 129 bpm. Average resistive index me
asures 0.73. S/D ratio average measures 3.74.



Signer Name: Dewayne Mayfield Jr, MD 

Signed: 1/10/2020 10:14 AM

 Workstation Name: [id number]

## 2020-01-10 NOTE — ULTRASOUND REPORT
OB ULTRASOUND >= 14 WEEKS FETUS



INDICATION: IUGR, Absent end flow



COMPARISON: None



FINDINGS:



A single gestation intrauterine pregnancy is present with cephalic presentation. The placenta is post
erior, left lateral, grade 1 and free of the cervical os.  Fetal heart tones measure 132 bpm.



Amniotic fluid volume is normal with a fluid index of 14.7.



Fetal anatomical survey was not performed.



Biparietal diameter is 6.9 cm which equals 27 weeks 5 days.

Head circumference is 26.2 cm which equals 28 weeks 3 days.

Abdominal circumference is 21.2 cm which equals 25 weeks 5 days.

Femur length is 5.4 cm which equals 28 weeks 4 days.

Overall estimated sonographic age is 27 weeks 4 days.



HC/AC ratio: 1.23

Cephalic index: 76.3

Estimated fetal weight 1029 g





IMPRESSION:



Viable single intrauterine pregnancy as described



Signer Name: Dewayne Mayfield Jr, MD 

Signed: 1/10/2020 10:11 AM

 Workstation Name: [id number]

## 2020-01-10 NOTE — CONSULTATION
Prenatal Consult Note





- Parent Education


I met with parent(s) and discussed the following:: Need for NICU admission, Poss

ible need for intubation and surfactant or other resp support, Temperature 

regulation, Head ultrasounds to evaluate IVH, Eye exams for ROP screening, 

Possible need for IV fluids/TPN and IV antibiotics, Possible need for umbilical 

lines, Importance of providing breast milk & encouraged pumping aft delivery, 

Donor breast milk if baby meets criteria after birth, Slow feeding advancement 

and monitoring of tolerance. NG/OG feeds, Need to monitor for jaundice, Data for

survival & survival without significant co-morbidities


Parent(s) demonstrated understanding of all the information:: Yes





Assessment and Plan





- Assessment


Gestation:: 31 (31 1/7)


Estimated Fetal Weight: N/A


Baby's gender: Male


Baby's name: Yehuda


Additional Comment: 31 1/7 weeker, severe IUGR (EFW 1%) and AEDF with minimal 

PNC.  H/O CS x1 severe IUGR at 29weeks who is now 5YO.





- Plan


Plan: 


Agree with Mag & steroids (mother rec'd)


Will attend delivery


Please call NICU with questions

## 2020-01-29 ENCOUNTER — HOSPITAL ENCOUNTER (INPATIENT)
Dept: HOSPITAL 5 - 3A | Age: 30
LOS: 3 days | Discharge: HOME | End: 2020-02-01
Attending: OBSTETRICS & GYNECOLOGY | Admitting: OBSTETRICS & GYNECOLOGY
Payer: MEDICAID

## 2020-01-29 DIAGNOSIS — E66.9: ICD-10-CM

## 2020-01-29 DIAGNOSIS — O36.5930: ICD-10-CM

## 2020-01-29 DIAGNOSIS — Z23: ICD-10-CM

## 2020-01-29 DIAGNOSIS — O34.211: ICD-10-CM

## 2020-01-29 DIAGNOSIS — Z3A.34: ICD-10-CM

## 2020-01-29 PROCEDURE — 85014 HEMATOCRIT: CPT

## 2020-01-29 PROCEDURE — 86901 BLOOD TYPING SEROLOGIC RH(D): CPT

## 2020-01-29 PROCEDURE — 88307 TISSUE EXAM BY PATHOLOGIST: CPT

## 2020-01-29 PROCEDURE — 85025 COMPLETE CBC W/AUTO DIFF WBC: CPT

## 2020-01-29 PROCEDURE — 36415 COLL VENOUS BLD VENIPUNCTURE: CPT

## 2020-01-29 PROCEDURE — 86850 RBC ANTIBODY SCREEN: CPT

## 2020-01-29 PROCEDURE — 86900 BLOOD TYPING SEROLOGIC ABO: CPT

## 2020-01-29 PROCEDURE — 85018 HEMOGLOBIN: CPT

## 2020-01-29 NOTE — ANESTHESIA CONSULTATION
Anesthesia Consult and Med Hx


Date of service: 01/29/20





- Airway


Anesthetic Teeth Evaluation: Good


ROM Head & Neck: Adequate


Mental/Hyoid Distance: Adequate


Mallampati Class: Class III


Intubation Access Assessment: Probably Good





- Pulmonary Exam


CTA: Yes





- Cardiac Exam


Cardiac Exam: RRR





- Pre-Operative Health Status


ASA Pre-Surgery Classification: ASA3


Proposed Anesthetic Plan: Spinal





- Pre-Anesthesia Comment


Pre-Anesthesia Comments: c/section x 1, no anesthesia complications





- Pulmonary


Hx Smoking: No


Hx Asthma: No


Hx Respiratory Symptoms: No


SOB: No


COPD: No


Home Oxygen Therapy: No


Hx Pneumonia: No


Hx Sleep Apnea: No





- Cardiovascular System


Hx Hypertension: No


Hx Coronary Artery Disease: No


Hx Heart Attack/AMI: No


Hx Angina: No


Hx Percutaneous Transluminal Coronary Angioplasty (PTCA): No


Hx Cardia Arrhythmia: No


Hx Pacemaker: No


Hx Internal Defibrillator: No


Hx Valvular Heart Disease: No


Hx Heart Murmur: No


Hx Peripheral Vascular Disease: No





- Central Nervous System


Hx Neuromuscular Disorder: No


Hx Seizures: No


CVA: No


Hx Back Pain: No


Hx Psychiatric Problems: No





- Gastrointestinal


Hx Ulcer: No


Hx Gastroesophageal Reflux Disease: No





- Endocrine


Hx Renal Disease: No


Hx End Stage Renal Disease: No


Hx Cirrhosis: No


Hx Liver Disease: No


Hx Insulin Dependent Diabetes: No


Hx Non-Insulin Dependent Diabetes: No


Hx Thyroid Disease: No


Hx Hypothyroidism: No


Hx Hyperthyroidism: No





- Hematic


Hx Anemia: No


Hx Sickle Cell Disease: No





- Other Systems


Hx Alcohol Use: No


Hx Substance Use: No


Hx Cancer: No


Hx Obesity: Yes

## 2020-01-29 NOTE — CONSULTATION
Prenatal Consult Note





- Parent Education


I met with parent(s) and discussed the following:: Need for NICU admission, Poss

ible need for intubation and surfactant or other resp support, Temperature 

regulation, Possible need for IV fluids/TPN and IV antibiotics, Possible need 

for umbilical lines, Importance of providing breast milk & encouraged pumping 

aft delivery, Donor breast milk if baby meets criteria after birth, Slow feeding

advancement and monitoring of tolerance. NG/OG feeds, Need to monitor for 

jaundice, Data for survival & survival without significant co-morbidities


Parent(s) demonstrated understanding of all the information:: Yes





Assessment and Plan





- Assessment


Gestation:: 34


Estimated Fetal Weight: N/A


Baby's gender: Male


Baby's name: Yehuda or Lv


Additional Comment: 31YO  mother here for a schedule repeat CS of a IUGR 34 

weeker.  Rec's beta x2 and mag 2 weeks ago with previous hospitalization visit. 

H/O 29 weeker 4 years ago at [facility].





- Plan


Plan: 


A


Will attend delivery


Please call NICU with questions

## 2020-01-29 NOTE — ANESTHESIA DAY OF SURGERY
Anesthesia Day of Surgery





- Day of Surgery


Patient Examined: Yes


Patient H&P Reviewed: Yes


Patient is NPO: No


Beta Blockers: No


Cardiac Clearance: No


Pulmonary Clearance: No


Luigi's Test: N/A

## 2020-01-30 LAB
BASOPHILS # (AUTO): 0.1 K/MM3 (ref 0–0.1)
BASOPHILS NFR BLD AUTO: 0.4 % (ref 0–1.8)
EOSINOPHIL # BLD AUTO: 0.1 K/MM3 (ref 0–0.4)
EOSINOPHIL NFR BLD AUTO: 0.6 % (ref 0–4.3)
HCT VFR BLD CALC: 37.8 % (ref 30.3–42.9)
HCT VFR BLD CALC: 41.2 % (ref 30.3–42.9)
HGB BLD-MCNC: 12.8 GM/DL (ref 10.1–14.3)
HGB BLD-MCNC: 14.1 GM/DL (ref 10.1–14.3)
LYMPHOCYTES # BLD AUTO: 3.1 K/MM3 (ref 1.2–5.4)
LYMPHOCYTES NFR BLD AUTO: 26.4 % (ref 13.4–35)
MCHC RBC AUTO-ENTMCNC: 34 % (ref 30–34)
MCV RBC AUTO: 97 FL (ref 79–97)
MONOCYTES # (AUTO): 0.7 K/MM3 (ref 0–0.8)
MONOCYTES % (AUTO): 6.4 % (ref 0–7.3)
PLATELET # BLD: 273 K/MM3 (ref 140–440)
RBC # BLD AUTO: 4.25 M/MM3 (ref 3.65–5.03)

## 2020-01-30 RX ADMIN — HYDROCODONE BITARTRATE AND ACETAMINOPHEN PRN EACH: 5; 325 TABLET ORAL at 16:10

## 2020-01-30 RX ADMIN — KETOROLAC TROMETHAMINE PRN MG: 30 INJECTION, SOLUTION INTRAMUSCULAR at 21:36

## 2020-01-30 RX ADMIN — KETOROLAC TROMETHAMINE PRN MG: 30 INJECTION, SOLUTION INTRAMUSCULAR at 09:53

## 2020-01-30 NOTE — OPERATIVE REPORT
Operative Report


Operative Report: 





DATE OF OPERATION: 20


PREOPERATIVE DIAGNOSES:


1. Prior  section.


2. IUGR


3.Abnormal doppler recommneded delivery from [facility] 


4. IUP 34 weeks 





POSTOPERATIVE DIAGNOSES:


1-4 MANJIT


OPERATION PERFORMED: Repeat low transverse .


SURGEON: Aditi Lewis MD


ANESTHESIA: Spinal.


ESTIMATED BLOOD LOSS: 800 mL.


FINDINGS: A viable female infant 2 pound 14oz .


COMPLICATIONS: None.


DISPOSITION: Stable.


DESCRIPTION OF OPERATION: After informed consent was obtained, the patient was 

brought back to the operative suite where adequate spinal anesthesia was 

obtained. The patient was then placed in the dorsal supine position and prepped 

and draped in the sterile fashion. A repeat Pfannenstiel skin incision was made 

with a blade and carried down through the subcutaneous tissues to the fascia, 

which was extended in the transverse fascia with Carrasco scissors. The fascial 

incision was then dissected off the rectus muscles both bluntly and sharply. The

rectus muscle was  in the midline. The peritoneum was entered bluntly. 

The peritoneal incision was then extended both superiorly and inferiorly with 

good visualization of the underlying bowel and bladder. The bladder blade was 

placed, and the vesicouterine fascia was incised to create a bladder flap in a 

low transverse position. This was developed digitally.


A low transverse uterine incision was made with the blade and carried down 

through the layers of the uterus until membranes bulged through the incision. 

The uterine incision was then extended digitally. Hand was placed inside the 

pelvis, and the head was brought up out of the pelvis and delivered 

atraumatically with gentle fundal pressure. Prior to the head being delivered, 

actually through the skin, the sound of the baby starting to cry was noted. 

After the head was delivered, the mouth and nares were aggressively bulb 

suctioned. Remainder of the infant was delivered, and the infant was passed to 

the awaiting nursing staff for additional care. Cord was doubly clamped and cut 

and cord blood was obtained. The placenta was then manually extracted, and the 

uterus was exteriorized. The uterus was cleaned of remaining clot.


The uterine incision was readily identified and closed in two layers, first one 

with running locking followed by second imbricating layer of 0 Vicryl. The 

vesicouterine fascia was then reapproximated with 2-0 Vicryl. The adnexa were 

within normal limits. The uterus was placed back inside the pelvis. Copious 

irrigation and inspection of the incision was satisfactory. The peritoneum was 

then closed with 2-0 Vicryl in a running fashion. The fascia was closed with 1 

Vicryl from one angle to the next. Subcutaneous tissues were copiously 

irrigated, and final bleeders were cauterized. The incision was then 

reapproximated with surgical staples in a standard fashion.

## 2020-01-30 NOTE — PROCEDURE NOTE
OB Delivery Note





- Delivery


Date of Delivery: 20


Surgeon: KENRICK BOSS


Estimated blood loss: other (800 cc)





-  Section


Preop diagnosis: repeat 


Postop diagnosis: same


 section procedure:  section


Disposition: PACU


Complications: none


Narrative: 





see op note





- Infant


  ** A


Apgar at 1 minute: 8


Apgar at 5 minutes: 9


Infant Gender: Male (2 pounds 14 oz)

## 2020-01-31 PROCEDURE — 3E0134Z INTRODUCTION OF SERUM, TOXOID AND VACCINE INTO SUBCUTANEOUS TISSUE, PERCUTANEOUS APPROACH: ICD-10-PCS | Performed by: OBSTETRICS & GYNECOLOGY

## 2020-01-31 PROCEDURE — 3E0234Z INTRODUCTION OF SERUM, TOXOID AND VACCINE INTO MUSCLE, PERCUTANEOUS APPROACH: ICD-10-PCS | Performed by: OBSTETRICS & GYNECOLOGY

## 2020-01-31 RX ADMIN — HYDROCODONE BITARTRATE AND ACETAMINOPHEN PRN EACH: 5; 325 TABLET ORAL at 18:12

## 2020-01-31 RX ADMIN — OXYCODONE AND ACETAMINOPHEN PRN TAB: 5; 325 TABLET ORAL at 07:52

## 2020-01-31 RX ADMIN — Medication SCH EACH: at 10:32

## 2020-01-31 RX ADMIN — IBUPROFEN PRN MG: 800 TABLET, FILM COATED ORAL at 10:36

## 2020-01-31 RX ADMIN — OXYCODONE AND ACETAMINOPHEN PRN TAB: 5; 325 TABLET ORAL at 01:48

## 2020-01-31 RX ADMIN — IBUPROFEN PRN MG: 800 TABLET, FILM COATED ORAL at 21:18

## 2020-01-31 RX ADMIN — FERROUS SULFATE TAB 325 MG (65 MG ELEMENTAL FE) SCH MG: 325 (65 FE) TAB at 10:32

## 2020-01-31 RX ADMIN — OXYCODONE AND ACETAMINOPHEN PRN TAB: 5; 325 TABLET ORAL at 14:10

## 2020-02-01 VITALS — DIASTOLIC BLOOD PRESSURE: 87 MMHG | SYSTOLIC BLOOD PRESSURE: 136 MMHG

## 2020-02-01 RX ADMIN — FERROUS SULFATE TAB 325 MG (65 MG ELEMENTAL FE) SCH MG: 325 (65 FE) TAB at 12:04

## 2020-02-01 RX ADMIN — FERROUS SULFATE TAB 325 MG (65 MG ELEMENTAL FE) SCH MG: 325 (65 FE) TAB at 10:00

## 2020-02-01 RX ADMIN — Medication SCH EACH: at 12:05

## 2020-02-01 RX ADMIN — Medication SCH EACH: at 10:00

## 2020-02-01 RX ADMIN — OXYCODONE AND ACETAMINOPHEN PRN TAB: 5; 325 TABLET ORAL at 09:50

## 2020-02-01 RX ADMIN — IBUPROFEN PRN MG: 800 TABLET, FILM COATED ORAL at 06:24

## 2020-02-01 NOTE — DISCHARGE SUMMARY
Providers





- Providers


Date of Admission: 


20 19:26





Date of discharge: 20


Attending physician: 


KENRICK BOSS MD





                                        





20 20:18


Consult to Physician [CONS] Stat 


   Comment: 


   Consulting Provider: FRED HERRERA


   Physician Instructions: consult nicu


   Reason For Exam: 34 WEEKS GEST





20 08:58


Consult to Case Management [CONS] Routine 


   Services Needed at Discharge: 


   Notified:: Case Management


   Phone number called:: [phone]


   Was contact made?: Yes


   If yes, spoke with:: Janessa


   Time called:: 09:01











Primary care physician: 


KENRICK BOSS MD








Hospitalization


Reason for admission:  section


Delivery: 


Procedure:  section


Episiotomy: none


Laceration: none


Incision: normal, dry


Other postpartum procedures: none


Postpartum complications: none


Discharge diagnosis: IUP at term delivered


 baby: male


Hospital course: 





Patient had a repeat cesec for early  delivery secondary to IUGR abn 

dopplers recommneded by MFM. Baby  in NICU doing well. SD/c early. vss. f/u next

 week for incision check with staple removal at pod10 


Condition at discharge: Good


Disposition: [place]-01 TO HOME OR SELFCARE





Plan





- Discharge Medications


Prescriptions: 


Ferrous Sulfate [Feosol 325 MG tab] 325 mg PO BID #30 tablet


Ibuprofen [Motrin] 600 mg PO Q8H PRN #30 tablet


 PRN Reason: Pain


oxyCODONE /ACETAMINOPHEN [Percocet 5/325] 1 tab PO Q6HR PRN #30 tablet


 PRN Reason: Pain





- Provider Discharge Summary


Additional instructions: 


[]  Smoking cessation referral if applicable(refer to patient education folder 

for contact #)


[]  Refer to [facility] Booklet








Call your doctor immediately for:


* Fever > 100.5


* Heavy vaginal bleeding ( >1 pad per hour)


* Severe persistent headache


* Shortness of breath


* Reddened, hot, painful area to leg or breast


* Drainage or odor from incision.





* Keep incision clean and dry at all times and follow doctor's instructions 

regarding bathing/showering











- Follow up plan


Follow up: 


KENRICK BOSS MD [Primary Care Provider] - 7 Days

## 2021-07-21 ENCOUNTER — HOSPITAL ENCOUNTER (OUTPATIENT)
Dept: HOSPITAL 5 - TRG | Age: 31
Discharge: HOME | End: 2021-07-21
Attending: OBSTETRICS & GYNECOLOGY
Payer: MEDICAID

## 2021-07-21 VITALS — SYSTOLIC BLOOD PRESSURE: 119 MMHG | DIASTOLIC BLOOD PRESSURE: 70 MMHG

## 2021-07-21 DIAGNOSIS — O26.893: Primary | ICD-10-CM

## 2021-07-21 DIAGNOSIS — Z3A.30: ICD-10-CM

## 2021-07-21 DIAGNOSIS — R25.2: ICD-10-CM

## 2021-07-21 LAB
BACTERIA #/AREA URNS HPF: (no result) /HPF
BILIRUB UR QL STRIP: (no result)
BLOOD UR QL VISUAL: (no result)
MUCOUS THREADS #/AREA URNS HPF: (no result) /HPF
PH UR STRIP: 6 [PH] (ref 5–7)
RBC #/AREA URNS HPF: 14 /HPF (ref 0–6)
UROBILINOGEN UR-MCNC: 4 MG/DL (ref ?–2)
WBC #/AREA URNS HPF: 3 /HPF (ref 0–6)

## 2021-07-21 PROCEDURE — 81001 URINALYSIS AUTO W/SCOPE: CPT

## 2021-07-21 PROCEDURE — 96360 HYDRATION IV INFUSION INIT: CPT

## 2021-07-21 PROCEDURE — 59025 FETAL NON-STRESS TEST: CPT

## 2021-08-14 ENCOUNTER — HOSPITAL ENCOUNTER (OUTPATIENT)
Dept: HOSPITAL 5 - TRG | Age: 31
Discharge: HOME | End: 2021-08-14
Attending: OBSTETRICS & GYNECOLOGY
Payer: MEDICAID

## 2021-08-14 VITALS — SYSTOLIC BLOOD PRESSURE: 118 MMHG | DIASTOLIC BLOOD PRESSURE: 58 MMHG

## 2021-08-14 DIAGNOSIS — R51.9: ICD-10-CM

## 2021-08-14 DIAGNOSIS — O26.893: Primary | ICD-10-CM

## 2021-08-14 DIAGNOSIS — Z3A.33: ICD-10-CM

## 2021-09-16 ENCOUNTER — HOSPITAL ENCOUNTER (INPATIENT)
Dept: HOSPITAL 5 - APU | Age: 31
LOS: 2 days | Discharge: HOME | End: 2021-09-18
Attending: OBSTETRICS & GYNECOLOGY | Admitting: OBSTETRICS & GYNECOLOGY
Payer: MEDICAID

## 2021-09-16 DIAGNOSIS — L91.0: ICD-10-CM

## 2021-09-16 DIAGNOSIS — Z23: ICD-10-CM

## 2021-09-16 DIAGNOSIS — Z20.822: ICD-10-CM

## 2021-09-16 DIAGNOSIS — A60.00: ICD-10-CM

## 2021-09-16 DIAGNOSIS — Z3A.38: ICD-10-CM

## 2021-09-16 DIAGNOSIS — O36.5930: Primary | ICD-10-CM

## 2021-09-16 DIAGNOSIS — O34.211: ICD-10-CM

## 2021-09-16 LAB
BASOPHILS # (AUTO): 0.1 K/MM3 (ref 0–0.1)
BASOPHILS NFR BLD AUTO: 0.8 % (ref 0–1.8)
EOSINOPHIL # BLD AUTO: 0.1 K/MM3 (ref 0–0.4)
EOSINOPHIL NFR BLD AUTO: 0.7 % (ref 0–4.3)
HCT VFR BLD CALC: 36.3 % (ref 30.3–42.9)
HGB BLD-MCNC: 13.2 GM/DL (ref 10.1–14.3)
LYMPHOCYTES # BLD AUTO: 0.1 K/MM3 (ref 1.2–5.4)
LYMPHOCYTES NFR BLD AUTO: 21.5 % (ref 13.4–35)
MCHC RBC AUTO-ENTMCNC: 36 % (ref 30–34)
MCV RBC AUTO: 93 FL (ref 79–97)
MONOCYTES # (AUTO): 0.6 K/MM3 (ref 0–0.8)
MONOCYTES % (AUTO): 5.1 % (ref 0–7.3)
PLATELET # BLD: 297 K/MM3 (ref 140–440)
RBC # BLD AUTO: 3.92 M/MM3 (ref 3.65–5.03)

## 2021-09-16 PROCEDURE — 96361 HYDRATE IV INFUSION ADD-ON: CPT

## 2021-09-16 PROCEDURE — 96360 HYDRATION IV INFUSION INIT: CPT

## 2021-09-16 PROCEDURE — 36415 COLL VENOUS BLD VENIPUNCTURE: CPT

## 2021-09-16 PROCEDURE — 85025 COMPLETE CBC W/AUTO DIFF WBC: CPT

## 2021-09-16 PROCEDURE — 86850 RBC ANTIBODY SCREEN: CPT

## 2021-09-16 PROCEDURE — 86900 BLOOD TYPING SEROLOGIC ABO: CPT

## 2021-09-16 PROCEDURE — 85014 HEMATOCRIT: CPT

## 2021-09-16 PROCEDURE — 86901 BLOOD TYPING SEROLOGIC RH(D): CPT

## 2021-09-16 PROCEDURE — 59025 FETAL NON-STRESS TEST: CPT

## 2021-09-16 PROCEDURE — 85018 HEMOGLOBIN: CPT

## 2021-09-16 PROCEDURE — U0003 INFECTIOUS AGENT DETECTION BY NUCLEIC ACID (DNA OR RNA); SEVERE ACUTE RESPIRATORY SYNDROME CORONAVIRUS 2 (SARS-COV-2) (CORONAVIRUS DISEASE [COVID-19]), AMPLIFIED PROBE TECHNIQUE, MAKING USE OF HIGH THROUGHPUT TECHNOLOGIES AS DESCRIBED BY CMS-2020-01-R: HCPCS

## 2021-09-16 PROCEDURE — 96374 THER/PROPH/DIAG INJ IV PUSH: CPT

## 2021-09-16 RX ADMIN — KETOROLAC TROMETHAMINE SCH MG: 30 INJECTION, SOLUTION INTRAMUSCULAR at 22:54

## 2021-09-16 RX ADMIN — CEFAZOLIN SCH MLS/HR: 330 INJECTION, POWDER, FOR SOLUTION INTRAMUSCULAR; INTRAVENOUS at 22:54

## 2021-09-16 NOTE — PROCEDURE NOTE
OB Delivery Note





- Delivery


Date of Delivery: 21


Surgeon: TEJAL MCCONNELL


Estimated blood loss: other (QBL pending)





-  Section


Preop diagnosis: repeat , other (IUGR )


Postop diagnosis: same


 section procedure:  section, repeat low transverse


Disposition: PACU


Complications: none


Narrative: 





Please see operative report 





- Infant


  ** A


Apgar at 1 minute: 8


Apgar at 5 minutes: 9


Infant Gender: Female (2650g (5lb 13oz) @ 1640 pm)

## 2021-09-16 NOTE — ANESTHESIA CONSULTATION
Anesthesia Consult and Med Hx


Date of service: 09/16/21





- Airway


Anesthetic Teeth Evaluation: Good


ROM Head & Neck: Adequate


Mental/Hyoid Distance: Adequate


Mallampati Class: Class III


Intubation Access Assessment: Possibly Difficult





- Pulmonary Exam


CTA: Yes





- Cardiac Exam


Cardiac Exam: RRR





- Pre-Operative Health Status


ASA Pre-Surgery Classification: ASA2


Proposed Anesthetic Plan: Spinal


Nerve Block: TAP





- Pulmonary


Hx Smoking: No


Hx Asthma: No


Hx Respiratory Symptoms: No


SOB: No


COPD: No


Hx Pneumonia: No


Hx Sleep Apnea: No





- Cardiovascular System


Hx Hypertension: No


Hx Coronary Artery Disease: No


Hx Heart Attack/AMI: No


Hx Angina: No


Hx Percutaneous Transluminal Coronary Angioplasty (PTCA): No


Hx Cardia Arrhythmia: No


Hx Pacemaker: No


Hx Internal Defibrillator: No


Hx Valvular Heart Disease: No


Hx Heart Murmur: No


Hx Peripheral Vascular Disease: No





- Central Nervous System


Hx Neuromuscular Disorder: No


Hx Seizures: No


CVA: No


Hx Back Pain: No


Hx Psychiatric Problems: No





- Gastrointestinal


Hx Ulcer: No


Hx Gastroesophageal Reflux Disease: No





- Endocrine


Hx Renal Disease: No


Hx End Stage Renal Disease: No


Hx Cirrhosis: No


Hx Liver Disease: No


Hx Insulin Dependent Diabetes: No


Hx Non-Insulin Dependent Diabetes: No


Hx Thyroid Disease: No


Hx Hypothyroidism: No


Hx Hyperthyroidism: No





- Hematic


Hx Anemia: No


Hx Sickle Cell Disease: No





- Other Systems


Hx Alcohol Use: No


Hx Substance Use: No


Hx Cancer: No


Hx Obesity: Yes

## 2021-09-16 NOTE — HISTORY AND PHYSICAL REPORT
History of Present Illness


Date of examination: 21


Chief complaint: 


scheduled  section 


History of present illness: 





Pt is a 31 year old female  BILL 21 at 38w4d who presents for 

scheduled  section secondary to IUGR and previous  x 2. She 

denies vaginal bleeding or leakage of fluid. She has had insufficient prenatal 

care at [facility] since 20 wks (4 vists at 20, 24, 31 and 38 wks) 

complicated by growth restriction, obesity, marginal cord insertion, suspected 

Toxoplasmosis exposure, two prior  sections, genital herpes, growth 

restriction in two prior pregnancies. She is GBS positive. 





Pt did not complete her 1 hour diabetes screen. 





Past History


Past Medical History: other (Obesity; H/o false positive RPR )


Past Surgical History:  section (, )


GYN History: herpes


Family/Genetic History: diabetes


Social history: no significant social history





- Obstetrical History


Expected Date of Delivery: 21


Actual Gestation: 38 Week(s) 4 Day(s) 


: 3


Para: 2


Hx # Term Pregnancies: 0


Number of  Pregnancies: 2


Spontaneous Abortions: 0


Induced : 0


Number of Living Children: 2





Medications and Allergies


                                    Allergies











Allergy/AdvReac Type Severity Reaction Status Date / Time


 


No Known Allergies Allergy   Verified 20 20:16











                                Home Medications











 Medication  Instructions  Recorded  Confirmed  Last Taken  Type


 


Ferrous Sulfate [Feosol 325 MG tab] 325 mg PO BID #30 tablet 20  Unknown 

Rx


 


Ibuprofen [Motrin] 600 mg PO Q8H PRN #30 tablet 20  Unknown Rx


 


oxyCODONE /ACETAMINOPHEN [Percocet 1 tab PO Q6HR PRN #30 tablet 20  

Unknown Rx





5/325]     











Active Meds: 


Active Medications





Famotidine (Famotidine 20 Mg/2 Ml Inj)  20 mg IV ONCE ONE


   Stop: 21 08:50











Review of Systems


All systems: negative





- Physical Exam


Breasts: Positive: deferred


Abdomen: Positive: soft (obese, gravid )


Uterus: Positive: enlarged (gravid )


Extremities: Positive: edema (trace )





- Obstetrical


FHR: auscultation normal


Uterine Contraction Monitor Mode: External


Uterine Tone Measurement Phase: Resting





Results


All other labs normal.








Assessment and Plan





A: IUP at 38w4d


    IUGR


    Marginal Cord Insertion 


    Previous  x 2


    Genital Herpes


    GBS Positive 


    





P: Proceed with repeat  section and other indicated procedures

## 2021-09-16 NOTE — PROGRESS NOTE
Spinal Anesthesia Block





- Spinal Anesthesia Block


Start Time: 15:50


Stop Time: 16:00


Performed by:: ANTONIA CALI ([facility])


Procedure: 





Spinal anesthesia block is being performed for [C/S].  H&P, labs have been 

reviewed.  Patient's questions and concerns have been answered.  Informed 

consent has been performed.  Timeout has was performed.  Patient in sitting 

position on side of bed.  Sterile prep and drape was performed.  3 mL 1% lid

ocaine skin wheal at L [3]-L [4].  Needle introducer advanced.  25-gauge spinal 

needle advanced, [+] CSF [-] blood.  [Marcaine 10mg and Precedex 5mcg] Spinal 

dose was given.  All needles removed.  Patient tolerated procedure well.

## 2021-09-16 NOTE — OPERATIVE REPORT
Operative Report


Operative Report: 


Date of procedure: 2021 


Preoperative diagnosis: 1) IUP at 38w4d 2) IUGR 3) Previous  x 2 4) 

Obesity 5) Keloid Skin 


Postoperative diagnosis: Same 


Procedure:  Repeat low transverse  section, Lysis of Adhesions 


Surgeon: Katherin Sarmiento M.D.


Anesthesia: Regional


Findings:


1) Viable female , Apgars 8 and 9, weight 2650 g, (5 lb 13 oz) in 

cephalic presentation


2) Normal-appearing uterus ovaries and tubes


Estimated blood loss: QBL pending


IV fluids: 2000 mL 


Urine output: 150 mL, clear at the end of the procedure 


Drains: Butcher to gravity


Specimens: None


Complications:None. Counts correct x 3


Disposition: Stable to PACU





Indication for procedure:


Pt is a 31 year old  at 38w4d with two prior  sections and IUGR 

presents for scheduled  section. 





Operation in detail: 


After the risks, benefits, alternatives and complications were explained to the 

patient she gave informed consent for the procedure.  She was subsequently taken

to the operating room where regional anesthesia was noted to be adequate. She 

was placed in the dorsal supine position with leftward tilt and prepped and 

draped in a normal sterile fashion.  Fetal heart tones were noted prior to 

incision.  A timeout was performed.





A Pfannenstiel skin incision was made with the knife and carried down to the 

layer of the fascia with the Bovie.  The fascia was incised in the midline and 

the fascial incision was extended bilaterally with the Bovie. The fascial 

incision was then stretched.  The rectus muscles were then  in the 

midline and partially transected for adequate visualization. The peritoneum was 

then entered bluntly. The peritoneal incision was extended with good v

isualization of the bladder.  The peritoneal incision was then stretched.  

Omental adhesions to the lower uterine segment were lysed with Bovie cautery an 

Laci retractor was placed. The bladder blade was then placed.


 


The vesicouterine peritoneum was grasped with smooth pick ups and incised with 

Metzenbaum scissors. A bladder flap was then created digitally and the bladder 

blade was replaced. A transverse incision was made in the lower uterine segment 

with a knife and extended bilaterally with the bandage scissors.  Amniotomy was 

performed with egress of clear fluid. Fetal head delivered with ease, followed 

by shoulders and body.   bulb suctioned at delivery. Cord clamped and 

cut.  handed to NICU staff in attendance. The placenta was then delivered

manually.  





The uterus was then exteriorized and cleared of all clots and debris.  The 

hysterotomy was then reapproximated with 0 Monocryl in a running locked fashion.

 A second layer of the same suture was used in imbricating fashion.   The 

hysterotomy was inspected and hemostasis was noted.   The gutters were irrigated

and cleared of all clots and debris.  The hysterotomy was again inspected and 

noted to be hemostatic. Surgicel was placed over the hysterotomy. Intercede was 

placed over the anterior surface of the uterus.  The Laci retractor was 

removed. The uterus was placed back into the peritoneal cavity. The peritoneum 

was reapproximated with 0 Monocryl in a running fashion incorporating the rectus

muscles. Surgicel was placed over the rectus muscles. The fascia was 

reapproximated with 0 Vicryl in a running fashion. The skin was reapproximated 

with 3-0 Monocryl in a subcuticular fashion.  The incision was then covered with

steri strips and a pressure dressing. The procedure was then ended.  The patient

tolerated the procedure well and was taken to the PACU in stable condition.  All

instrument, lap, and needle counts were correct 3.

## 2021-09-16 NOTE — ANESTHESIA DAY OF SURGERY
Anesthesia Day of Surgery





- Day of Surgery


Patient Examined: Yes


Patient H&P Reviewed: Yes


Patient is NPO: Yes


Beta Blockers: No


Cardiac Clearance: No


Pulmonary Clearance: No


Luigi's Test: N/A

## 2021-09-17 LAB
HCT VFR BLD CALC: 38.9 % (ref 30.3–42.9)
HGB BLD-MCNC: 13.1 GM/DL (ref 10.1–14.3)

## 2021-09-17 PROCEDURE — 3E0234Z INTRODUCTION OF SERUM, TOXOID AND VACCINE INTO MUSCLE, PERCUTANEOUS APPROACH: ICD-10-PCS | Performed by: OBSTETRICS & GYNECOLOGY

## 2021-09-17 RX ADMIN — KETOROLAC TROMETHAMINE SCH: 30 INJECTION, SOLUTION INTRAMUSCULAR at 17:36

## 2021-09-17 RX ADMIN — CEFAZOLIN SCH MLS/HR: 330 INJECTION, POWDER, FOR SOLUTION INTRAMUSCULAR; INTRAVENOUS at 05:48

## 2021-09-17 RX ADMIN — OXYCODONE AND ACETAMINOPHEN PRN TAB: 5; 325 TABLET ORAL at 10:37

## 2021-09-17 RX ADMIN — IBUPROFEN PRN MG: 600 TABLET, FILM COATED ORAL at 11:36

## 2021-09-17 RX ADMIN — OXYCODONE AND ACETAMINOPHEN PRN TAB: 5; 325 TABLET ORAL at 04:06

## 2021-09-17 RX ADMIN — OXYCODONE AND ACETAMINOPHEN PRN TAB: 5; 325 TABLET ORAL at 17:34

## 2021-09-17 NOTE — PROGRESS NOTE
Assessment and Plan





- Patient Problems


(1) Previous  section


Current Visit: No   Status: Acute   


Plan to address problem: 


Patient doing well


Routine postoperative care








Subjective





- Subjective


Date of service: 21


Interval history: 


Patient is postop day 1 status post a repeat  delivery.  She is 

tolerating her diet without complication.  The patient is voiding and ambulating

in the room without difficulty.  Her pain is well controlled.





Patient reports: appetite normal, voiding normally, pain well controlled





Objective





- Vital Signs


Latest vital signs: 


                                   Vital Signs











  Temp Pulse Resp BP BP Pulse Ox Pulse Ox


 


 21 12:17  98.0 F  89  19  130/68   96 


 


 21 08:00        99


 


 21 07:58  97.9 F  75  18  135/88   98 


 


 21 04:06    19    


 


 21 04:00  98.1 F  79  20  134/78   96 


 


 21 01:54  97.4 F L  76  18  121/78   99 


 


 21 22:54    18    


 


 21 20:30  97.6 F  65  20   116/81  99  99


 


 21 19:35    18    


 


 21 19:25  97.6 F  70  13  102/73  122/72  100 


 


 21 18:46  97.6 F  69  13  95/48   97 


 


 21 18:26   78  15  146/69   97 


 


 21 18:21   78  13  174/81   97 


 


 21 18:18   84  12  162/86   88 


 


 21 18:11   72  12  177/70   


 


 21 18:07   75  10 L    


 


 21 18:02   88  18  148/100   91 


 


 21 17:45   91 H  15  113/57   100 


 


 21 17:40   75  13  99/40   100 


 


 21 17:38   76  13  102/47   100 


 


 21 15:37   101 H     100 


 


 21 15:32   93 H     100 


 


 21 15:27   89     99 


 


 21 15:22   91 H     86 


 


 21 15:20   62     90 


 


 21 15:17   80     98 


 


 21 15:15   99 H     80 L 


 


 21 15:10   97 H     96 


 


 21 15:08       53 L 


 


 21 15:05   111 H     88 


 


 21 15:03   84     85 


 


 21 15:00  98.6 F      


 


 21 14:59   101 H     98 


 


 21 14:54   92 H     100 


 


 21 14:49   90     98 


 


 21 14:45  98.6 F      


 


 21 14:44   76     99 


 


 21 14:06   89     100 


 


 21 14:01   88     100 


 


 21 13:56   88     99 


 


 21 13:51   108 H     99 


 


 21 13:46   87     99 


 


 21 13:41   89     100 


 


 21 13:36   87     100 


 


 21 13:31   94 H     99 


 


 21 13:28   95 H   130/82   


 


 21 13:26   104 H     100 








                                Intake and Output











 21





 22:59 06:59 14:59


 


Intake Total 2000 530 


 


Output Total 175 1800 


 


Balance 1825 -1270 


 


Intake:   


 


  IV 2000 50 


 


    ANCEF/NS 1 GM/50 ML 1 gm  50 





    In 50 ml @ 100 mls/hr IV   





    Q8[facility] Rx#:[id number]   


 


  Intake, Free Water  480 


 


Output:   


 


  Urine 175 1800 


 


    Indwelling Catheter  1600 


 


    Uretheral ([place]cher) 25  


 


    Void  200 


 


Other:   


 


  Total, Output Amount  1800 














- Labs


Labs: 


                              Abnormal lab results











  21 Range/Units





  Unknown 


 


WBC  12.7 H  (4.5-11.0)  K/mm3


 


MCH  34 H  (28-32)  pg


 


MCHC  36 H  (30-34)  %


 


Lymph # (Auto)  0.1 L  (1.2-5.4)  K/mm3


 


Seg Neutrophils %  71.9 H  (40.0-70.0)  %


 


Seg Neutrophils #  9.2 H  (1.8-7.7)  K/mm3

## 2021-09-18 VITALS — DIASTOLIC BLOOD PRESSURE: 60 MMHG | SYSTOLIC BLOOD PRESSURE: 117 MMHG

## 2021-09-18 RX ADMIN — IBUPROFEN PRN MG: 600 TABLET, FILM COATED ORAL at 06:49

## 2021-09-18 RX ADMIN — IBUPROFEN PRN MG: 600 TABLET, FILM COATED ORAL at 12:06

## 2021-09-18 RX ADMIN — OXYCODONE AND ACETAMINOPHEN PRN TAB: 5; 325 TABLET ORAL at 01:12

## 2021-09-18 RX ADMIN — OXYCODONE AND ACETAMINOPHEN PRN TAB: 5; 325 TABLET ORAL at 11:00

## 2021-09-18 NOTE — PROGRESS NOTE
Assessment and Plan





- Patient Problems


(1) Previous  section


Current Visit: No   Status: Acute   


Plan to address problem: 


Patient doing well


Discharge home








Subjective





- Subjective


Date of service: 21


Interval history: 


Patient reports feeling well today.  Pain is better controlled.  She is 

tolerating regular diet without complication.


Patient reports: appetite normal, voiding normally, pain well controlled


: doing well





Objective





- Vital Signs


Latest vital signs: 


                                   Vital Signs











  Temp Pulse Resp BP Pulse Ox Pulse Ox


 


 21 07:56  97.6 F  88  16  117/60  97 


 


 21 07:54       98


 


 21 06:49    12   


 


 21 01:22  98.0 F  85  20  128/83  100 


 


 21 01:12    12   


 


 21 20:25       98


 


 21 15:41  98.0 F  94 H  19  113/75  100 


 


 21 12:17  98.0 F  89  19  130/68  96 








                                Intake and Output











 21





 22:59 06:59 14:59


 


Intake Total  240 


 


Output Total  400 


 


Balance  -160 


 


Intake:   


 


  Intake, Free Water  240 


 


Output:   


 


  Urine  400 


 


    Void  400 


 


Other:   


 


  Total, Output Amount  300 


 


  # Voids   


 


    Void  1 














- Exam


Abdomen: Present: normal appearance


Uterus: Present: normal, firm

## 2021-09-18 NOTE — DISCHARGE SUMMARY
Providers





- Providers


Date of Admission: 


21 12:50





Date of discharge: 21


Attending physician: 


TEJAL MCCONNELL





                                        





21 17:38


Consult to Lactation Consultant [CONS] Routine 


   Reason For Exam: 











Primary care physician: 


STELLA MOONEY








Hospitalization


Reason for admission:  section


Delivery: 


Procedure:  section, repeat low transverse


Discharge diagnosis: IUP at term delivered


Hospital course: 





The patient was admitted for repeat  delivery for intrauterine growth 

restriction.  Please see operative note for details of surgery.  Her 

postoperative course was uneventful.


Condition at discharge: Good


Disposition: 01 HOME / SELF CARE / HOMELESS





- Discharge Diagnoses


(1) Previous  section


Status: Acute   





Plan





- Discharge Medications


Prescriptions: 


Ibuprofen [Motrin] 800 mg PO Q8HR PRN #60 tablet


 PRN Reason: Pain , Severe (7-10)


oxyCODONE /ACETAMINOPHEN [Percocet 5/325] 1 tab PO Q6HR PRN #30 tablet


 PRN Reason: Pain





- Provider Discharge Summary


Activity: no sex for 6 weeks, no heavy lifting 4 weeks, no strenuous exercise


Diet: routine


Instructions: routine


Additional instructions: 


[]  Smoking cessation referral if applicable(refer to patient education folder 

for contact #)


[]  Refer to [facility]'s [facility] Booklet








Call your doctor immediately for:


* Fever > 100.5


* Heavy vaginal bleeding ( >1 pad per hour)


* Severe persistent headache


* Shortness of breath


* Reddened, hot, painful area to leg or breast


* Drainage or odor from incision.





* Keep incision clean and dry at all times and follow doctor's instructions 

regarding bathing/showering





Schedule postoperative visit in 2 weeks





- Follow up plan